# Patient Record
Sex: FEMALE | Race: WHITE | NOT HISPANIC OR LATINO | Employment: OTHER | URBAN - METROPOLITAN AREA
[De-identification: names, ages, dates, MRNs, and addresses within clinical notes are randomized per-mention and may not be internally consistent; named-entity substitution may affect disease eponyms.]

---

## 2023-08-10 LAB
ALANINE AMINOTRANSFERASE (SGPT) (U/L) IN SER/PLAS: 16 U/L (ref 7–45)
ALBUMIN (G/DL) IN SER/PLAS: 4.4 G/DL (ref 3.4–5)
ALKALINE PHOSPHATASE (U/L) IN SER/PLAS: 65 U/L (ref 33–136)
ANION GAP IN SER/PLAS: 11 MMOL/L (ref 10–20)
ASPARTATE AMINOTRANSFERASE (SGOT) (U/L) IN SER/PLAS: 17 U/L (ref 9–39)
BILIRUBIN TOTAL (MG/DL) IN SER/PLAS: 0.4 MG/DL (ref 0–1.2)
CALCIDIOL (25 OH VITAMIN D3) (NG/ML) IN SER/PLAS: 37 NG/ML
CALCIUM (MG/DL) IN SER/PLAS: 9.5 MG/DL (ref 8.6–10.3)
CARBON DIOXIDE, TOTAL (MMOL/L) IN SER/PLAS: 30 MMOL/L (ref 21–32)
CHLORIDE (MMOL/L) IN SER/PLAS: 102 MMOL/L (ref 98–107)
CREATININE (MG/DL) IN SER/PLAS: 0.75 MG/DL (ref 0.5–1.05)
GFR FEMALE: 87 ML/MIN/1.73M2
GLUCOSE (MG/DL) IN SER/PLAS: 92 MG/DL (ref 74–99)
POTASSIUM (MMOL/L) IN SER/PLAS: 4.1 MMOL/L (ref 3.5–5.3)
PROTEIN TOTAL: 7.3 G/DL (ref 6.4–8.2)
SODIUM (MMOL/L) IN SER/PLAS: 139 MMOL/L (ref 136–145)
THYROTROPIN (MIU/L) IN SER/PLAS BY DETECTION LIMIT <= 0.05 MIU/L: 0.94 MIU/L (ref 0.44–3.98)
UREA NITROGEN (MG/DL) IN SER/PLAS: 15 MG/DL (ref 6–23)

## 2023-08-14 LAB
THYROGLOBULIN AB (IU/ML) IN SER/PLAS: <0.9 IU/ML (ref 0–4)
THYROGLOBULIN LC-MS/MS: ABNORMAL NG/ML (ref 1.3–31.8)
THYROGLOBULIN: 0.1 NG/ML (ref 1.3–31.8)

## 2023-08-30 PROBLEM — I10 HYPERTENSION, BENIGN: Status: ACTIVE | Noted: 2023-08-30

## 2023-08-30 PROBLEM — D22.5 ATYPICAL NEVUS OF THORACIC REGION: Status: ACTIVE | Noted: 2023-08-30

## 2023-08-30 PROBLEM — K22.70 BARRETT ESOPHAGUS: Status: ACTIVE | Noted: 2023-08-30

## 2023-08-30 PROBLEM — I25.10 CORONARY ARTERY DISEASE INVOLVING NATIVE CORONARY ARTERY OF NATIVE HEART WITHOUT ANGINA PECTORIS: Status: ACTIVE | Noted: 2023-08-30

## 2023-08-30 RX ORDER — CALCIUM CARBONATE 500(1250)
2 TABLET ORAL DAILY
COMMUNITY

## 2023-08-30 RX ORDER — ATORVASTATIN CALCIUM 80 MG/1
1 TABLET, FILM COATED ORAL DAILY
COMMUNITY
Start: 2018-07-27 | End: 2023-12-20

## 2023-08-30 RX ORDER — ERGOCALCIFEROL 1.25 MG/1
1 CAPSULE ORAL
COMMUNITY
Start: 2016-01-14 | End: 2023-10-23 | Stop reason: ALTCHOICE

## 2023-08-30 RX ORDER — ZINC GLUCONATE 50 MG
1 TABLET ORAL DAILY
COMMUNITY

## 2023-08-30 RX ORDER — AMLODIPINE BESYLATE 5 MG/1
1 TABLET ORAL DAILY
COMMUNITY
Start: 2019-10-15 | End: 2023-12-04

## 2023-08-30 RX ORDER — ASPIRIN 81 MG/1
1 TABLET ORAL DAILY
COMMUNITY

## 2023-08-30 RX ORDER — METOPROLOL TARTRATE 25 MG/1
0.5 TABLET, FILM COATED ORAL DAILY
COMMUNITY
Start: 2016-09-08 | End: 2023-12-04

## 2023-08-30 RX ORDER — OMEPRAZOLE 20 MG/1
1 CAPSULE, DELAYED RELEASE ORAL DAILY
COMMUNITY
Start: 2016-05-31

## 2023-08-30 RX ORDER — ACETAMINOPHEN 160 MG
TABLET,DISINTEGRATING ORAL
COMMUNITY

## 2023-08-30 RX ORDER — LEVOTHYROXINE SODIUM 112 UG/1
112 TABLET ORAL
COMMUNITY
Start: 2021-09-08

## 2023-09-15 LAB
CHOLESTEROL (MG/DL) IN SER/PLAS: 162 MG/DL (ref 0–199)
CHOLESTEROL IN HDL (MG/DL) IN SER/PLAS: 59.6 MG/DL
CHOLESTEROL/HDL RATIO: 2.7
LDL: 70 MG/DL (ref 0–99)
TRIGLYCERIDE (MG/DL) IN SER/PLAS: 164 MG/DL (ref 0–149)
VLDL: 33 MG/DL (ref 0–40)

## 2023-10-06 ENCOUNTER — OFFICE VISIT (OUTPATIENT)
Dept: CARDIOLOGY | Facility: CLINIC | Age: 68
End: 2023-10-06
Payer: MEDICARE

## 2023-10-06 VITALS
HEART RATE: 73 BPM | DIASTOLIC BLOOD PRESSURE: 80 MMHG | BODY MASS INDEX: 28.87 KG/M2 | RESPIRATION RATE: 16 BRPM | SYSTOLIC BLOOD PRESSURE: 136 MMHG | OXYGEN SATURATION: 98 % | WEIGHT: 165.6 LBS

## 2023-10-06 DIAGNOSIS — I10 HYPERTENSION, BENIGN: ICD-10-CM

## 2023-10-06 DIAGNOSIS — I25.10 CORONARY ARTERY DISEASE INVOLVING NATIVE CORONARY ARTERY OF NATIVE HEART WITHOUT ANGINA PECTORIS: Primary | ICD-10-CM

## 2023-10-06 PROCEDURE — 99214 OFFICE O/P EST MOD 30 MIN: CPT | Performed by: INTERNAL MEDICINE

## 2023-10-06 PROCEDURE — 1159F MED LIST DOCD IN RCRD: CPT | Performed by: INTERNAL MEDICINE

## 2023-10-06 PROCEDURE — 3075F SYST BP GE 130 - 139MM HG: CPT | Performed by: INTERNAL MEDICINE

## 2023-10-06 PROCEDURE — 3079F DIAST BP 80-89 MM HG: CPT | Performed by: INTERNAL MEDICINE

## 2023-10-06 NOTE — LETTER
October 6, 2023     Shin Alvarez DO  [address]  [address]  [facility] [address]    Patient: Charo Arnold   YOB: 1955   Date of Visit: 10/6/2023       Dear Dr. Shin Alvarez DO:    Thank you for referring Charo Arnold to me for evaluation. Below are my notes for this consultation.  If you have questions, please do not hesitate to call me. I look forward to following your patient along with you.       Sincerely,     Shin Wagner MD      CC: No Recipients  ______________________________________________________________________________________    Subjective  Patient ID: Charo Arnold is a 68 y.o. female who presents for No chief complaint on file..  The patient is a 68-year-old female former patient of Dr. Ford with a history of coronary artery disease, hyperlipidemia, hypothyroidism, thyroid cancer who presents for evaluation. Her cardiac history begins in 2002 when she presented with a NSTEMI. She underwent cardiac catheterization which demonstrated terminal disease in an obtuse marginal artery that was treated medically. Her main vessels seemed to be angiographically normal. She has done well since that time on medical therapy. She underwent noninvasive stress testing in 2011 which was a normal study. She presents today for follow-up.     She denies any chest pain, shortness of breath, palpitations, lightheadedness, syncope, orthopnea, PND.  She does admits to some dyspnea on exertion which has been slowly progressive over time.     August 2020: Total cholesterol 196, HDL 63, LDL 96, triglycerides 182.   November 2021: Total cholesterol 188, HDL 62, LDL 88, triglycerides 188.   September 2023: Total cholesterol 16 2, LDL 70, HDL 60, triglycerides 164.        Review of Systems   All other systems reviewed and are negative.      Objective  Physical Exam  Vitals and nursing note reviewed.   Constitutional:       Appearance: Normal appearance.   HENT:      Head: Normocephalic and  atraumatic.      Mouth/Throat:      Mouth: Mucous membranes are moist.      Pharynx: Oropharynx is clear.   Cardiovascular:      Rate and Rhythm: Normal rate and regular rhythm.      Pulses: Normal pulses.      Heart sounds: Normal heart sounds.   Pulmonary:      Effort: Pulmonary effort is normal.      Breath sounds: Normal breath sounds.   Abdominal:      General: Bowel sounds are normal.      Palpations: Abdomen is soft.   Musculoskeletal:      Cervical back: Neck supple.   Skin:     General: Skin is warm and dry.   Neurological:      General: No focal deficit present.      Mental Status: She is alert.   Psychiatric:         Mood and Affect: Mood normal.         Behavior: Behavior normal.         Assessment/Plan  Problem List Items Addressed This Visit             ICD-10-CM       Cardiac and Vasculature    Coronary artery disease involving native coronary artery of native heart without angina pectoris - Primary I25.10    Hypertension, benign I10        The patient is doing reasonably well from a cardiac standpoint.  She is having some dyspnea on exertion.  I asked her to increase her activity level to see if this improves.  If she still has this at her next visit or if this gets worse, we will consider additional testing.     Continue amlodipine and metoprolol. She'll otherwise stay on aspirin therapy.     Patient's LDL is at goal of 70.  She will continue atorvastatin.     Patient will follow-up with us in 6 months or sooner if she has more problems.

## 2023-10-06 NOTE — PROGRESS NOTES
Subjective   Patient ID: Charo Arnold is a 68 y.o. female who presents for No chief complaint on file..  The patient is a 68-year-old female former patient of Dr. Ford with a history of coronary artery disease, hyperlipidemia, hypothyroidism, thyroid cancer who presents for evaluation. Her cardiac history begins in 2002 when she presented with a NSTEMI. She underwent cardiac catheterization which demonstrated terminal disease in an obtuse marginal artery that was treated medically. Her main vessels seemed to be angiographically normal. She has done well since that time on medical therapy. She underwent noninvasive stress testing in 2011 which was a normal study. She presents today for follow-up.     She denies any chest pain, shortness of breath, palpitations, lightheadedness, syncope, orthopnea, PND.  She does admits to some dyspnea on exertion which has been slowly progressive over time.     August 2020: Total cholesterol 196, HDL 63, LDL 96, triglycerides 182.   November 2021: Total cholesterol 188, HDL 62, LDL 88, triglycerides 188.   September 2023: Total cholesterol 16 2, LDL 70, HDL 60, triglycerides 164.        Review of Systems   All other systems reviewed and are negative.      Objective   Physical Exam  Vitals and nursing note reviewed.   Constitutional:       Appearance: Normal appearance.   HENT:      Head: Normocephalic and atraumatic.      Mouth/Throat:      Mouth: Mucous membranes are moist.      Pharynx: Oropharynx is clear.   Cardiovascular:      Rate and Rhythm: Normal rate and regular rhythm.      Pulses: Normal pulses.      Heart sounds: Normal heart sounds.   Pulmonary:      Effort: Pulmonary effort is normal.      Breath sounds: Normal breath sounds.   Abdominal:      General: Bowel sounds are normal.      Palpations: Abdomen is soft.   Musculoskeletal:      Cervical back: Neck supple.   Skin:     General: Skin is warm and dry.   Neurological:      General: No focal deficit present.       Mental Status: She is alert.   Psychiatric:         Mood and Affect: Mood normal.         Behavior: Behavior normal.         Assessment/Plan   Problem List Items Addressed This Visit             ICD-10-CM       Cardiac and Vasculature    Coronary artery disease involving native coronary artery of native heart without angina pectoris - Primary I25.10    Hypertension, benign I10        The patient is doing reasonably well from a cardiac standpoint.  She is having some dyspnea on exertion.  I asked her to increase her activity level to see if this improves.  If she still has this at her next visit or if this gets worse, we will consider additional testing.     Continue amlodipine and metoprolol. She'll otherwise stay on aspirin therapy.     Patient's LDL is at goal of 70.  She will continue atorvastatin.     Patient will follow-up with us in 6 months or sooner if she has more problems.

## 2023-10-06 NOTE — LETTER
October 6, 2023       No Recipients    Patient: Charo Arnold   YOB: 1955   Date of Visit: 10/6/2023       Dear Dr. Duenas Recipients:    Thank you for referring Charo Arnold to me for evaluation. Below are my notes for this consultation.  If you have questions, please do not hesitate to call me. I look forward to following your patient along with you.       Sincerely,     Shin Wagner MD      CC:   No Recipients  ______________________________________________________________________________________    Subjective  Patient ID: Charo Arnold is a 68 y.o. female who presents for No chief complaint on file..  The patient is a 68-year-old female former patient of Dr. Ford with a history of coronary artery disease, hyperlipidemia, hypothyroidism, thyroid cancer who presents for evaluation. Her cardiac history begins in 2002 when she presented with a NSTEMI. She underwent cardiac catheterization which demonstrated terminal disease in an obtuse marginal artery that was treated medically. Her main vessels seemed to be angiographically normal. She has done well since that time on medical therapy. She underwent noninvasive stress testing in 2011 which was a normal study. She presents today for follow-up.     She denies any chest pain, shortness of breath, palpitations, lightheadedness, syncope, orthopnea, PND.  She does admits to some dyspnea on exertion which has been slowly progressive over time.     August 2020: Total cholesterol 196, HDL 63, LDL 96, triglycerides 182.   November 2021: Total cholesterol 188, HDL 62, LDL 88, triglycerides 188.   September 2023: Total cholesterol 16 2, LDL 70, HDL 60, triglycerides 164.        Review of Systems   All other systems reviewed and are negative.      Objective  Physical Exam  Vitals and nursing note reviewed.   Constitutional:       Appearance: Normal appearance.   HENT:      Head: Normocephalic and atraumatic.      Mouth/Throat:      Mouth: Mucous membranes  are moist.      Pharynx: Oropharynx is clear.   Cardiovascular:      Rate and Rhythm: Normal rate and regular rhythm.      Pulses: Normal pulses.      Heart sounds: Normal heart sounds.   Pulmonary:      Effort: Pulmonary effort is normal.      Breath sounds: Normal breath sounds.   Abdominal:      General: Bowel sounds are normal.      Palpations: Abdomen is soft.   Musculoskeletal:      Cervical back: Neck supple.   Skin:     General: Skin is warm and dry.   Neurological:      General: No focal deficit present.      Mental Status: She is alert.   Psychiatric:         Mood and Affect: Mood normal.         Behavior: Behavior normal.         Assessment/Plan  Problem List Items Addressed This Visit             ICD-10-CM       Cardiac and Vasculature    Coronary artery disease involving native coronary artery of native heart without angina pectoris - Primary I25.10    Hypertension, benign I10        The patient is doing reasonably well from a cardiac standpoint.  She is having some dyspnea on exertion.  I asked her to increase her activity level to see if this improves.  If she still has this at her next visit or if this gets worse, we will consider additional testing.     Continue amlodipine and metoprolol. She'll otherwise stay on aspirin therapy.     Patient's LDL is at goal of 70.  She will continue atorvastatin.     Patient will follow-up with us in 6 months or sooner if she has more problems.

## 2023-10-23 ENCOUNTER — OFFICE VISIT (OUTPATIENT)
Dept: PRIMARY CARE | Facility: CLINIC | Age: 68
End: 2023-10-23
Payer: MEDICARE

## 2023-10-23 VITALS
OXYGEN SATURATION: 98 % | DIASTOLIC BLOOD PRESSURE: 84 MMHG | SYSTOLIC BLOOD PRESSURE: 150 MMHG | HEART RATE: 80 BPM | BODY MASS INDEX: 28.87 KG/M2 | WEIGHT: 165.6 LBS

## 2023-10-23 DIAGNOSIS — Z12.31 ENCOUNTER FOR SCREENING MAMMOGRAM FOR MALIGNANT NEOPLASM OF BREAST: ICD-10-CM

## 2023-10-23 DIAGNOSIS — Z00.00 WELLNESS EXAMINATION: Primary | ICD-10-CM

## 2023-10-23 DIAGNOSIS — K22.719 BARRETT'S ESOPHAGUS WITH DYSPLASIA: ICD-10-CM

## 2023-10-23 DIAGNOSIS — I10 HYPERTENSION, BENIGN: ICD-10-CM

## 2023-10-23 PROCEDURE — 3079F DIAST BP 80-89 MM HG: CPT | Performed by: STUDENT IN AN ORGANIZED HEALTH CARE EDUCATION/TRAINING PROGRAM

## 2023-10-23 PROCEDURE — 1160F RVW MEDS BY RX/DR IN RCRD: CPT | Performed by: STUDENT IN AN ORGANIZED HEALTH CARE EDUCATION/TRAINING PROGRAM

## 2023-10-23 PROCEDURE — 1159F MED LIST DOCD IN RCRD: CPT | Performed by: STUDENT IN AN ORGANIZED HEALTH CARE EDUCATION/TRAINING PROGRAM

## 2023-10-23 PROCEDURE — 1036F TOBACCO NON-USER: CPT | Performed by: STUDENT IN AN ORGANIZED HEALTH CARE EDUCATION/TRAINING PROGRAM

## 2023-10-23 PROCEDURE — 99214 OFFICE O/P EST MOD 30 MIN: CPT | Performed by: STUDENT IN AN ORGANIZED HEALTH CARE EDUCATION/TRAINING PROGRAM

## 2023-10-23 PROCEDURE — G0439 PPPS, SUBSEQ VISIT: HCPCS | Performed by: STUDENT IN AN ORGANIZED HEALTH CARE EDUCATION/TRAINING PROGRAM

## 2023-10-23 PROCEDURE — 1125F AMNT PAIN NOTED PAIN PRSNT: CPT | Performed by: STUDENT IN AN ORGANIZED HEALTH CARE EDUCATION/TRAINING PROGRAM

## 2023-10-23 PROCEDURE — 1170F FXNL STATUS ASSESSED: CPT | Performed by: STUDENT IN AN ORGANIZED HEALTH CARE EDUCATION/TRAINING PROGRAM

## 2023-10-23 PROCEDURE — 3077F SYST BP >= 140 MM HG: CPT | Performed by: STUDENT IN AN ORGANIZED HEALTH CARE EDUCATION/TRAINING PROGRAM

## 2023-10-23 RX ORDER — FERROUS SULFATE, DRIED 160(50) MG
TABLET, EXTENDED RELEASE ORAL
COMMUNITY
End: 2023-10-23 | Stop reason: ALTCHOICE

## 2023-10-23 RX ORDER — ACETAMINOPHEN 500 MG
2000 TABLET ORAL DAILY
COMMUNITY

## 2023-10-23 RX ORDER — SIMVASTATIN 40 MG/1
40 TABLET, FILM COATED ORAL NIGHTLY
COMMUNITY
End: 2023-10-23 | Stop reason: ALTCHOICE

## 2023-10-23 ASSESSMENT — ACTIVITIES OF DAILY LIVING (ADL)
DRESSING: INDEPENDENT
BATHING: INDEPENDENT
GROCERY_SHOPPING: INDEPENDENT
TAKING_MEDICATION: INDEPENDENT
DOING_HOUSEWORK: INDEPENDENT
MANAGING_FINANCES: INDEPENDENT

## 2023-10-23 ASSESSMENT — PATIENT HEALTH QUESTIONNAIRE - PHQ9
SUM OF ALL RESPONSES TO PHQ9 QUESTIONS 1 AND 2: 0
1. LITTLE INTEREST OR PLEASURE IN DOING THINGS: NOT AT ALL
2. FEELING DOWN, DEPRESSED OR HOPELESS: NOT AT ALL

## 2023-10-23 ASSESSMENT — ENCOUNTER SYMPTOMS
LOSS OF SENSATION IN FEET: 0
DEPRESSION: 0
OCCASIONAL FEELINGS OF UNSTEADINESS: 0

## 2023-10-23 ASSESSMENT — PAIN SCALES - GENERAL: PAINLEVEL: 3

## 2023-10-23 NOTE — PATIENT INSTRUCTIONS
1.  Medicare wellness.  Overall doing well.  Screening labs are up-to-date she sees endocrinology and cardiology.  Screening mammogram order given today.  2 years ago she had both colonoscopy and endoscopy.  She will follow-up with her pharmacy for flu shot in several weeks as today she has a mild cold.  Would also ask her pharmacy about Prevnar 20 is a one-time pneumonia shot after age 65.    2.  Continue routine follow-ups with specialist providers.    3.  Bilateral hip pain trochanteric bursitis.  She is done an excellent job with regular aerobic activity walking and biking.  Discussed possibly adding once or twice a week a flexibility program as well as once or twice a week of strengthening program.    Shin Alvarez DO  for questions and f/u please call office   [place] [phone] [facility] [phone]  any forms needed please fax   [place] [phone] [facility] [phone]  for Physical therapy orders can call [phone]  for Radiology orders can call [phone]  for general referrals can call [id number]  for cardiac testing can call [phone]  for GI testing call [phone]

## 2023-10-23 NOTE — PROGRESS NOTES
Subjective   Patient ID: Charo Arnold is a 68 y.o. female who presents for Medicare Annual Wellness Visit Subsequent (She is not fasting.).    HPI comes in for wellness    Review of Systems  Constitutional: NO F, chills, or sweats  Eyes: no blurred vision or visual disturbance  ENT: no hearing loss, no congestion, no nasal discharge, no hoarseness and no sore throat.   Cardiovascular: no chest pain, no edema, no palps and no syncope.   Respiratory: no cough,no s.o.b. and no wheezing  Gastrointestinal: no abdominal pain, No C/D no N/V, no blood in stools  Genitourinary: no dysuria, no change in urinary frequency, no urinary hesitancy and no feelings of urinary urgency.   Musculoskeletal: no arthralgias,  no back pain and no myalgias.   Integumentary: no new skin lesions and no rashes.   Neurological: no difficulty walking, no headache, no limb weakness, no numbness and no tingling.   Psychiatric: no anxiety, no depression, no anhedonia and no substance use disorders.   Endocrine: no recent weight gain and no recent weight loss.   Hematologic/Lymphatic: no tendency for easy bruising and no swollen glands.  Objective   /84 (BP Location: Left arm, Patient Position: Sitting, BP Cuff Size: Adult)   Pulse 80   Wt 75.1 kg (165 lb 9.6 oz)   SpO2 98%   BMI 28.87 kg/m²     Physical Exam  gen- a & o x 3, nad, pleasant  heent- eomi, perrla, ear canals patent, TM's non-erythematous, no fluid, frontal and maxillary sinus's nontender  neck- supple, nontender, no palpable or enlarged nodes, no thyromegaly  heart- rrr, no murmurs  lungs- cta b/l , no w/r/r  chest- symmetric, nontender  ab- soft, nontender, no palpable organomegaly, postive bowel sounds  ex's- no c/c/e  neuro- CNs 2-12 grossly intact, full sensation and strength in all extremities    Assessment/Plan     1.  Medicare wellness.  Overall doing well.  Screening labs are up-to-date she sees endocrinology and cardiology.  Screening mammogram order given  today.  2 years ago she had both colonoscopy and endoscopy.  She will follow-up with her pharmacy for flu shot in several weeks as today she has a mild cold.  Would also ask her pharmacy about Prevnar 20 is a one-time pneumonia shot after age 65.    2.  Continue routine follow-ups with specialist providers.    3.  Bilateral hip pain trochanteric bursitis.  She is done an excellent job with regular aerobic activity walking and biking.  Discussed possibly adding once or twice a week a flexibility program as well as once or twice a week of strengthening program.

## 2023-10-26 ENCOUNTER — ANCILLARY PROCEDURE (OUTPATIENT)
Dept: RADIOLOGY | Facility: CLINIC | Age: 68
End: 2023-10-26
Payer: MEDICARE

## 2023-10-26 DIAGNOSIS — Z12.31 ENCOUNTER FOR SCREENING MAMMOGRAM FOR MALIGNANT NEOPLASM OF BREAST: ICD-10-CM

## 2023-10-26 DIAGNOSIS — Z00.00 WELLNESS EXAMINATION: ICD-10-CM

## 2023-10-26 PROCEDURE — 77067 SCR MAMMO BI INCL CAD: CPT | Performed by: RADIOLOGY

## 2023-10-26 PROCEDURE — 77063 BREAST TOMOSYNTHESIS BI: CPT | Performed by: RADIOLOGY

## 2023-10-26 PROCEDURE — 77063 BREAST TOMOSYNTHESIS BI: CPT

## 2023-12-03 DIAGNOSIS — I10 HYPERTENSION, BENIGN: ICD-10-CM

## 2023-12-04 RX ORDER — METOPROLOL TARTRATE 25 MG/1
12.5 TABLET, FILM COATED ORAL DAILY
Qty: 45 TABLET | Refills: 3 | Status: SHIPPED | OUTPATIENT
Start: 2023-12-04

## 2023-12-04 RX ORDER — AMLODIPINE BESYLATE 5 MG/1
5 TABLET ORAL DAILY
Qty: 90 TABLET | Refills: 3 | Status: SHIPPED | OUTPATIENT
Start: 2023-12-04

## 2023-12-20 DIAGNOSIS — E78.5 HYPERLIPIDEMIA, UNSPECIFIED HYPERLIPIDEMIA TYPE: ICD-10-CM

## 2023-12-20 RX ORDER — ATORVASTATIN CALCIUM 80 MG/1
80 TABLET, FILM COATED ORAL DAILY
Qty: 90 TABLET | Refills: 3 | Status: SHIPPED | OUTPATIENT
Start: 2023-12-20 | End: 2024-12-19

## 2024-04-19 ENCOUNTER — OFFICE VISIT (OUTPATIENT)
Dept: CARDIOLOGY | Facility: CLINIC | Age: 69
End: 2024-04-19
Payer: MEDICARE

## 2024-04-19 VITALS
WEIGHT: 165.2 LBS | HEIGHT: 64 IN | DIASTOLIC BLOOD PRESSURE: 86 MMHG | OXYGEN SATURATION: 94 % | BODY MASS INDEX: 28.2 KG/M2 | SYSTOLIC BLOOD PRESSURE: 152 MMHG | HEART RATE: 75 BPM

## 2024-04-19 DIAGNOSIS — I10 HYPERTENSION, BENIGN: ICD-10-CM

## 2024-04-19 DIAGNOSIS — I25.10 CORONARY ARTERY DISEASE INVOLVING NATIVE CORONARY ARTERY OF NATIVE HEART WITHOUT ANGINA PECTORIS: ICD-10-CM

## 2024-04-19 PROCEDURE — 3077F SYST BP >= 140 MM HG: CPT | Performed by: INTERNAL MEDICINE

## 2024-04-19 PROCEDURE — 1159F MED LIST DOCD IN RCRD: CPT | Performed by: INTERNAL MEDICINE

## 2024-04-19 PROCEDURE — 3079F DIAST BP 80-89 MM HG: CPT | Performed by: INTERNAL MEDICINE

## 2024-04-19 PROCEDURE — 99214 OFFICE O/P EST MOD 30 MIN: CPT | Performed by: INTERNAL MEDICINE

## 2024-04-19 PROCEDURE — 93000 ELECTROCARDIOGRAM COMPLETE: CPT | Performed by: INTERNAL MEDICINE

## 2024-04-19 NOTE — LETTER
April 19, 2024     Shin Alvarez DO  [address]  [address]  [facility] [address]    Patient: Charo Arnold   YOB: 1955   Date of Visit: 4/19/2024       Dear Dr. Shin Alvarez DO:    Thank you for referring Charo Arnold to me for evaluation. Below are my notes for this consultation.  If you have questions, please do not hesitate to call me. I look forward to following your patient along with you.       Sincerely,     Shin Wagner MD      CC: No Recipients  ______________________________________________________________________________________        [facility] Cardiology Outpatient Follow-up Visit     Reason for Visit: CAD  HPI: Charo Arnold is a 69 y.o.  female who presents today for followup.     The patient is a 69-year-old female former patient of Dr. Ford with a history of coronary artery disease, hyperlipidemia, hypothyroidism, thyroid cancer who presents for evaluation. Her cardiac history begins in 2002 when she presented with a NSTEMI. She underwent cardiac catheterization which demonstrated terminal disease in an obtuse marginal artery that was treated medically. Her main vessels seemed to be angiographically normal. She has done well since that time on medical therapy. She underwent noninvasive stress testing in 2011 which was a normal study. She presents today for follow-up.     She denies any chest pain, shortness of breath, palpitations, lightheadedness, syncope, orthopnea, PND.  Her dyspnea on exertion has resolved after starting an exercise program.  She rides stationary bike for a total of an hour a day.  She also takes a 1 mile walk a day.     August 2020: Total cholesterol 196, HDL 63, LDL 96, triglycerides 182.   November 2021: Total cholesterol 188, HDL 62, LDL 88, triglycerides 188.   September 2023: Total cholesterol 16 2, LDL 70, HDL 60, triglycerides 164.  4/19/2024 ECG: Normal sinus rhythm, otherwise normal    Past Medical History:   She has a past medical  history of Old myocardial infarction, Personal history of malignant neoplasm of thyroid, Personal history of other diseases of the respiratory system (01/10/2019), Personal history of other diseases of the respiratory system (01/24/2018), Personal history of other endocrine, nutritional and metabolic disease (07/27/2018), and Urinary tract infection, site not specified (11/13/2020).    Surgical History:   She has a past surgical history that includes Other surgical history (10/11/2019) and Breast biopsy.    Family History:   Family History   Problem Relation Name Age of Onset   • Hypertension Mother     • Lung cancer Father         Allergies:  Patient has no known allergies.     Social History:   Former smoker    Prior Cardiovascular Testing (Personally Reviewed):     Review of Systems:  Review of Systems   All other systems reviewed and are negative.      Outpatient Medications:    Current Outpatient Medications:   •  amLODIPine (Norvasc) 5 mg tablet, TAKE 1 TABLET BY MOUTH DAILY, Disp: 90 tablet, Rfl: 3  •  aspirin 81 mg EC tablet, Take 1 tablet (81 mg) by mouth once daily., Disp: , Rfl:   •  atorvastatin (Lipitor) 80 mg tablet, Take 1 tablet (80 mg) by mouth once daily., Disp: 90 tablet, Rfl: 3  •  calcium carbonate (Oscal) 500 mg calcium (1,250 mg) tablet, Take 2 tablets (2,500 mg) by mouth once daily., Disp: , Rfl:   •  cholecalciferol (Vitamin D3) 50 mcg (2,000 unit) capsule, Take 1 capsule (50 mcg) by mouth once daily., Disp: , Rfl:   •  levothyroxine (Synthroid) 112 mcg tablet, Take 1 tablet (112 mcg) by mouth., Disp: , Rfl:   •  magnesium oxide 500 mg capsule, Take 1 capsule (500 mg) by mouth once daily., Disp: , Rfl:   •  metoprolol tartrate (Lopressor) 25 mg tablet, TAKE ONE-HALF TABLET BY MOUTH  DAILY, Disp: 45 tablet, Rfl: 3  •  omeprazole (PriLOSEC) 20 mg DR capsule, Take 1 capsule (20 mg) by mouth once daily., Disp: , Rfl:   •  pediatric multivitamin (Gummi Bear Multivitamin) tablet,chewable, Chew.,  "Disp: , Rfl:      Last Recorded Vitals  /86 (BP Location: Right arm, Patient Position: Sitting, BP Cuff Size: Adult)   Pulse 75   Ht 1.626 m (5' 4\")   Wt 74.9 kg (165 lb 3.2 oz)   SpO2 94%   BMI 28.36 kg/m²     Physical Exam:    Physical Exam  Vitals reviewed.   Constitutional:       Appearance: Normal appearance.   HENT:      Head: Normocephalic and atraumatic.      Mouth/Throat:      Mouth: Mucous membranes are moist.      Pharynx: Oropharynx is clear.   Eyes:      Extraocular Movements: Extraocular movements intact.      Conjunctiva/sclera: Conjunctivae normal.   Cardiovascular:      Rate and Rhythm: Normal rate and regular rhythm.      Pulses: Normal pulses.      Heart sounds: Normal heart sounds.   Pulmonary:      Effort: Pulmonary effort is normal.      Breath sounds: Normal breath sounds.   Abdominal:      General: Bowel sounds are normal.      Palpations: Abdomen is soft.   Musculoskeletal:      Cervical back: Neck supple.   Skin:     General: Skin is warm and dry.   Neurological:      General: No focal deficit present.      Mental Status: She is alert.   Psychiatric:         Mood and Affect: Mood normal.         Behavior: Behavior normal.         Lab/Radiology/Diagnostic Review:    Labs    Lab Results   Component Value Date    GLUCOSE 92 08/10/2023    CALCIUM 9.5 08/10/2023     08/10/2023    K 4.1 08/10/2023    CO2 30 08/10/2023     08/10/2023    BUN 15 08/10/2023    CREATININE 0.75 08/10/2023       Lab Results   Component Value Date    WBC 7.9 09/22/2020    HGB 13.5 09/22/2020    HCT 43.2 09/22/2020    MCV 95 09/22/2020     09/22/2020       Lab Results   Component Value Date    CHOL 162 09/15/2023    CHOL 188 11/05/2021    CHOL 196 08/29/2020     Lab Results   Component Value Date    HDL 59.6 09/15/2023    HDL 62.0 11/05/2021    HDL 63.4 08/29/2020     No results found for: \"LDLCALC\"  Lab Results   Component Value Date    TRIG 164 (H) 09/15/2023    TRIG 188 (H) 11/05/2021    " "TRIG 182 (H) 08/29/2020     No components found for: \"CHOLHDL\"    No results found for: \"BNP\"    Lab Results   Component Value Date    TSH 0.94 08/10/2023       Assessment:    The patient is doing reasonably well from a cardiac standpoint.  Her dyspnea on exertion has resolved with exercise.     Continue amlodipine and metoprolol. She'll otherwise stay on aspirin therapy.     Patient's LDL is at goal of 70.  She will continue atorvastatin.     Patient will follow-up with us in 12 months or sooner if she has more problems.     Shin Wagner MD      "

## 2024-04-19 NOTE — PROGRESS NOTES
[facility] Cardiology Outpatient Follow-up Visit     Reason for Visit: CAD  HPI: Charo Arnold is a 69 y.o.  female who presents today for followup.     The patient is a 69-year-old female former patient of Dr. Ford with a history of coronary artery disease, hyperlipidemia, hypothyroidism, thyroid cancer who presents for evaluation. Her cardiac history begins in 2002 when she presented with a NSTEMI. She underwent cardiac catheterization which demonstrated terminal disease in an obtuse marginal artery that was treated medically. Her main vessels seemed to be angiographically normal. She has done well since that time on medical therapy. She underwent noninvasive stress testing in 2011 which was a normal study. She presents today for follow-up.     She denies any chest pain, shortness of breath, palpitations, lightheadedness, syncope, orthopnea, PND.  Her dyspnea on exertion has resolved after starting an exercise program.  She rides stationary bike for a total of an hour a day.  She also takes a 1 mile walk a day.     August 2020: Total cholesterol 196, HDL 63, LDL 96, triglycerides 182.   November 2021: Total cholesterol 188, HDL 62, LDL 88, triglycerides 188.   September 2023: Total cholesterol 16 2, LDL 70, HDL 60, triglycerides 164.  4/19/2024 ECG: Normal sinus rhythm, otherwise normal    Past Medical History:   She has a past medical history of Old myocardial infarction, Personal history of malignant neoplasm of thyroid, Personal history of other diseases of the respiratory system (01/10/2019), Personal history of other diseases of the respiratory system (01/24/2018), Personal history of other endocrine, nutritional and metabolic disease (07/27/2018), and Urinary tract infection, site not specified (11/13/2020).    Surgical History:   She has a past surgical history that includes Other surgical history (10/11/2019) and Breast biopsy.    Family History:   Family History   Problem Relation Name Age of Onset  "   Hypertension Mother      Lung cancer Father         Allergies:  Patient has no known allergies.     Social History:   Former smoker    Prior Cardiovascular Testing (Personally Reviewed):     Review of Systems:  Review of Systems   All other systems reviewed and are negative.      Outpatient Medications:    Current Outpatient Medications:     amLODIPine (Norvasc) 5 mg tablet, TAKE 1 TABLET BY MOUTH DAILY, Disp: 90 tablet, Rfl: 3    aspirin 81 mg EC tablet, Take 1 tablet (81 mg) by mouth once daily., Disp: , Rfl:     atorvastatin (Lipitor) 80 mg tablet, Take 1 tablet (80 mg) by mouth once daily., Disp: 90 tablet, Rfl: 3    calcium carbonate (Oscal) 500 mg calcium (1,250 mg) tablet, Take 2 tablets (2,500 mg) by mouth once daily., Disp: , Rfl:     cholecalciferol (Vitamin D3) 50 mcg (2,000 unit) capsule, Take 1 capsule (50 mcg) by mouth once daily., Disp: , Rfl:     levothyroxine (Synthroid) 112 mcg tablet, Take 1 tablet (112 mcg) by mouth., Disp: , Rfl:     magnesium oxide 500 mg capsule, Take 1 capsule (500 mg) by mouth once daily., Disp: , Rfl:     metoprolol tartrate (Lopressor) 25 mg tablet, TAKE ONE-HALF TABLET BY MOUTH  DAILY, Disp: 45 tablet, Rfl: 3    omeprazole (PriLOSEC) 20 mg DR capsule, Take 1 capsule (20 mg) by mouth once daily., Disp: , Rfl:     pediatric multivitamin (Gummi Bear Multivitamin) tablet,chewable, Chew., Disp: , Rfl:      Last Recorded Vitals  /86 (BP Location: Right arm, Patient Position: Sitting, BP Cuff Size: Adult)   Pulse 75   Ht 1.626 m (5' 4\")   Wt 74.9 kg (165 lb 3.2 oz)   SpO2 94%   BMI 28.36 kg/m²     Physical Exam:    Physical Exam  Vitals reviewed.   Constitutional:       Appearance: Normal appearance.   HENT:      Head: Normocephalic and atraumatic.      Mouth/Throat:      Mouth: Mucous membranes are moist.      Pharynx: Oropharynx is clear.   Eyes:      Extraocular Movements: Extraocular movements intact.      Conjunctiva/sclera: Conjunctivae normal. " "  Cardiovascular:      Rate and Rhythm: Normal rate and regular rhythm.      Pulses: Normal pulses.      Heart sounds: Normal heart sounds.   Pulmonary:      Effort: Pulmonary effort is normal.      Breath sounds: Normal breath sounds.   Abdominal:      General: Bowel sounds are normal.      Palpations: Abdomen is soft.   Musculoskeletal:      Cervical back: Neck supple.   Skin:     General: Skin is warm and dry.   Neurological:      General: No focal deficit present.      Mental Status: She is alert.   Psychiatric:         Mood and Affect: Mood normal.         Behavior: Behavior normal.         Lab/Radiology/Diagnostic Review:    Labs    Lab Results   Component Value Date    GLUCOSE 92 08/10/2023    CALCIUM 9.5 08/10/2023     08/10/2023    K 4.1 08/10/2023    CO2 30 08/10/2023     08/10/2023    BUN 15 08/10/2023    CREATININE 0.75 08/10/2023       Lab Results   Component Value Date    WBC 7.9 09/22/2020    HGB 13.5 09/22/2020    HCT 43.2 09/22/2020    MCV 95 09/22/2020     09/22/2020       Lab Results   Component Value Date    CHOL 162 09/15/2023    CHOL 188 11/05/2021    CHOL 196 08/29/2020     Lab Results   Component Value Date    HDL 59.6 09/15/2023    HDL 62.0 11/05/2021    HDL 63.4 08/29/2020     No results found for: \"LDLCALC\"  Lab Results   Component Value Date    TRIG 164 (H) 09/15/2023    TRIG 188 (H) 11/05/2021    TRIG 182 (H) 08/29/2020     No components found for: \"CHOLHDL\"    No results found for: \"BNP\"    Lab Results   Component Value Date    TSH 0.94 08/10/2023       Assessment:    The patient is doing reasonably well from a cardiac standpoint.  Her dyspnea on exertion has resolved with exercise.     Continue amlodipine and metoprolol. She'll otherwise stay on aspirin therapy.     Patient's LDL is at goal of 70.  She will continue atorvastatin.     Patient will follow-up with us in 12 months or sooner if she has more problems.     Shin Wagner MD      "

## 2024-04-19 NOTE — LETTER
April 19, 2024       No Recipients    Patient: Charo Arnold   YOB: 1955   Date of Visit: 4/19/2024       Dear Dr. Duenas Recipients:    Thank you for referring Charo Arnold to me for evaluation. Below are my notes for this consultation.  If you have questions, please do not hesitate to call me. I look forward to following your patient along with you.       Sincerely,     Shin Wagner MD      CC:   No Recipients  ______________________________________________________________________________________        [facility] Cardiology Outpatient Follow-up Visit     Reason for Visit: CAD  HPI: Charo Arnold is a 69 y.o.  female who presents today for followup.     The patient is a 69-year-old female former patient of Dr. Ford with a history of coronary artery disease, hyperlipidemia, hypothyroidism, thyroid cancer who presents for evaluation. Her cardiac history begins in 2002 when she presented with a NSTEMI. She underwent cardiac catheterization which demonstrated terminal disease in an obtuse marginal artery that was treated medically. Her main vessels seemed to be angiographically normal. She has done well since that time on medical therapy. She underwent noninvasive stress testing in 2011 which was a normal study. She presents today for follow-up.     She denies any chest pain, shortness of breath, palpitations, lightheadedness, syncope, orthopnea, PND.  Her dyspnea on exertion has resolved after starting an exercise program.  She rides stationary bike for a total of an hour a day.  She also takes a 1 mile walk a day.     August 2020: Total cholesterol 196, HDL 63, LDL 96, triglycerides 182.   November 2021: Total cholesterol 188, HDL 62, LDL 88, triglycerides 188.   September 2023: Total cholesterol 16 2, LDL 70, HDL 60, triglycerides 164.  4/19/2024 ECG: Normal sinus rhythm, otherwise normal    Past Medical History:   She has a past medical history of Old myocardial infarction, Personal history of  malignant neoplasm of thyroid, Personal history of other diseases of the respiratory system (01/10/2019), Personal history of other diseases of the respiratory system (01/24/2018), Personal history of other endocrine, nutritional and metabolic disease (07/27/2018), and Urinary tract infection, site not specified (11/13/2020).    Surgical History:   She has a past surgical history that includes Other surgical history (10/11/2019) and Breast biopsy.    Family History:   Family History   Problem Relation Name Age of Onset   • Hypertension Mother     • Lung cancer Father         Allergies:  Patient has no known allergies.     Social History:   Former smoker    Prior Cardiovascular Testing (Personally Reviewed):     Review of Systems:  Review of Systems   All other systems reviewed and are negative.      Outpatient Medications:    Current Outpatient Medications:   •  amLODIPine (Norvasc) 5 mg tablet, TAKE 1 TABLET BY MOUTH DAILY, Disp: 90 tablet, Rfl: 3  •  aspirin 81 mg EC tablet, Take 1 tablet (81 mg) by mouth once daily., Disp: , Rfl:   •  atorvastatin (Lipitor) 80 mg tablet, Take 1 tablet (80 mg) by mouth once daily., Disp: 90 tablet, Rfl: 3  •  calcium carbonate (Oscal) 500 mg calcium (1,250 mg) tablet, Take 2 tablets (2,500 mg) by mouth once daily., Disp: , Rfl:   •  cholecalciferol (Vitamin D3) 50 mcg (2,000 unit) capsule, Take 1 capsule (50 mcg) by mouth once daily., Disp: , Rfl:   •  levothyroxine (Synthroid) 112 mcg tablet, Take 1 tablet (112 mcg) by mouth., Disp: , Rfl:   •  magnesium oxide 500 mg capsule, Take 1 capsule (500 mg) by mouth once daily., Disp: , Rfl:   •  metoprolol tartrate (Lopressor) 25 mg tablet, TAKE ONE-HALF TABLET BY MOUTH  DAILY, Disp: 45 tablet, Rfl: 3  •  omeprazole (PriLOSEC) 20 mg DR capsule, Take 1 capsule (20 mg) by mouth once daily., Disp: , Rfl:   •  pediatric multivitamin (Gummi Bear Multivitamin) tablet,chewable, Chew., Disp: , Rfl:      Last Recorded Vitals  /86 (BP  "Location: Right arm, Patient Position: Sitting, BP Cuff Size: Adult)   Pulse 75   Ht 1.626 m (5' 4\")   Wt 74.9 kg (165 lb 3.2 oz)   SpO2 94%   BMI 28.36 kg/m²     Physical Exam:    Physical Exam  Vitals reviewed.   Constitutional:       Appearance: Normal appearance.   HENT:      Head: Normocephalic and atraumatic.      Mouth/Throat:      Mouth: Mucous membranes are moist.      Pharynx: Oropharynx is clear.   Eyes:      Extraocular Movements: Extraocular movements intact.      Conjunctiva/sclera: Conjunctivae normal.   Cardiovascular:      Rate and Rhythm: Normal rate and regular rhythm.      Pulses: Normal pulses.      Heart sounds: Normal heart sounds.   Pulmonary:      Effort: Pulmonary effort is normal.      Breath sounds: Normal breath sounds.   Abdominal:      General: Bowel sounds are normal.      Palpations: Abdomen is soft.   Musculoskeletal:      Cervical back: Neck supple.   Skin:     General: Skin is warm and dry.   Neurological:      General: No focal deficit present.      Mental Status: She is alert.   Psychiatric:         Mood and Affect: Mood normal.         Behavior: Behavior normal.         Lab/Radiology/Diagnostic Review:    Labs    Lab Results   Component Value Date    GLUCOSE 92 08/10/2023    CALCIUM 9.5 08/10/2023     08/10/2023    K 4.1 08/10/2023    CO2 30 08/10/2023     08/10/2023    BUN 15 08/10/2023    CREATININE 0.75 08/10/2023       Lab Results   Component Value Date    WBC 7.9 09/22/2020    HGB 13.5 09/22/2020    HCT 43.2 09/22/2020    MCV 95 09/22/2020     09/22/2020       Lab Results   Component Value Date    CHOL 162 09/15/2023    CHOL 188 11/05/2021    CHOL 196 08/29/2020     Lab Results   Component Value Date    HDL 59.6 09/15/2023    HDL 62.0 11/05/2021    HDL 63.4 08/29/2020     No results found for: \"LDLCALC\"  Lab Results   Component Value Date    TRIG 164 (H) 09/15/2023    TRIG 188 (H) 11/05/2021    TRIG 182 (H) 08/29/2020     No components found for: " "\"CHOLHDL\"    No results found for: \"BNP\"    Lab Results   Component Value Date    TSH 0.94 08/10/2023       Assessment:    The patient is doing reasonably well from a cardiac standpoint.  Her dyspnea on exertion has resolved with exercise.     Continue amlodipine and metoprolol. She'll otherwise stay on aspirin therapy.     Patient's LDL is at goal of 70.  She will continue atorvastatin.     Patient will follow-up with us in 12 months or sooner if she has more problems.     Shin Wagner MD    "

## 2024-05-15 ENCOUNTER — OFFICE VISIT (OUTPATIENT)
Dept: PRIMARY CARE | Facility: CLINIC | Age: 69
End: 2024-05-15
Payer: MEDICARE

## 2024-05-15 VITALS
WEIGHT: 162 LBS | BODY MASS INDEX: 27.81 KG/M2 | HEART RATE: 80 BPM | OXYGEN SATURATION: 98 % | SYSTOLIC BLOOD PRESSURE: 136 MMHG | DIASTOLIC BLOOD PRESSURE: 74 MMHG

## 2024-05-15 DIAGNOSIS — S03.00XS DISLOCATION OF TEMPOROMANDIBULAR JOINT, SEQUELA: Primary | ICD-10-CM

## 2024-05-15 DIAGNOSIS — M72.2 PLANTAR FASCIA SYNDROME: ICD-10-CM

## 2024-05-15 PROBLEM — I25.2 HISTORY OF MYOCARDIAL INFARCTION: Status: ACTIVE | Noted: 2024-05-15

## 2024-05-15 PROBLEM — Z85.850 HISTORY OF MALIGNANT NEOPLASM OF THYROID: Status: ACTIVE | Noted: 2024-05-15

## 2024-05-15 PROBLEM — D23.5 DYSPLASTIC NEVUS OF TRUNK: Status: ACTIVE | Noted: 2023-08-30

## 2024-05-15 PROBLEM — Z86.39 HISTORY OF ELEVATED LIPIDS: Status: ACTIVE | Noted: 2024-05-15

## 2024-05-15 PROBLEM — J01.90 ACUTE SINUSITIS: Status: ACTIVE | Noted: 2024-05-15

## 2024-05-15 PROBLEM — M25.559 ARTHRALGIA OF HIP: Status: ACTIVE | Noted: 2024-05-15

## 2024-05-15 PROCEDURE — 1125F AMNT PAIN NOTED PAIN PRSNT: CPT | Performed by: STUDENT IN AN ORGANIZED HEALTH CARE EDUCATION/TRAINING PROGRAM

## 2024-05-15 PROCEDURE — 1159F MED LIST DOCD IN RCRD: CPT | Performed by: STUDENT IN AN ORGANIZED HEALTH CARE EDUCATION/TRAINING PROGRAM

## 2024-05-15 PROCEDURE — 1160F RVW MEDS BY RX/DR IN RCRD: CPT | Performed by: STUDENT IN AN ORGANIZED HEALTH CARE EDUCATION/TRAINING PROGRAM

## 2024-05-15 PROCEDURE — 1036F TOBACCO NON-USER: CPT | Performed by: STUDENT IN AN ORGANIZED HEALTH CARE EDUCATION/TRAINING PROGRAM

## 2024-05-15 PROCEDURE — 3078F DIAST BP <80 MM HG: CPT | Performed by: STUDENT IN AN ORGANIZED HEALTH CARE EDUCATION/TRAINING PROGRAM

## 2024-05-15 PROCEDURE — 99213 OFFICE O/P EST LOW 20 MIN: CPT | Performed by: STUDENT IN AN ORGANIZED HEALTH CARE EDUCATION/TRAINING PROGRAM

## 2024-05-15 PROCEDURE — 3075F SYST BP GE 130 - 139MM HG: CPT | Performed by: STUDENT IN AN ORGANIZED HEALTH CARE EDUCATION/TRAINING PROGRAM

## 2024-05-15 RX ORDER — PREDNISONE 10 MG/1
TABLET ORAL
Qty: 18 TABLET | Refills: 0 | Status: SHIPPED | OUTPATIENT
Start: 2024-05-15

## 2024-05-15 ASSESSMENT — PAIN SCALES - GENERAL: PAINLEVEL: 6

## 2024-05-15 ASSESSMENT — ENCOUNTER SYMPTOMS: DEPRESSION: 0

## 2024-05-15 NOTE — PATIENT INSTRUCTIONS
Shin Alvarez DO  for questions and f/u please call office   [place] [phone] [facility] [phone]  any forms needed please fax   [place] [phone] [facility] [phone]  for Physical therapy orders can call [phone]  for Radiology orders can call [phone]  for general referrals can call [phone]  for cardiac testing can call [phone]  for GI testing call [phone]

## 2024-05-15 NOTE — PROGRESS NOTES
Subjective   Patient ID: Charo Arnold is a 69 y.o. female who presents for Temporomandibular Joint Pain (Flare up.) and Foot Pain (Right foot pain.).    HPI comes in with flareup of right-sided TMJ.  Left-sided plantar fascia symptoms    Review of Systems  Constitutional: NO F, chills, or sweats  Eyes: no blurred vision or visual disturbance  ENT: no hearing loss, no congestion, no nasal discharge, no hoarseness and no sore throat.   Cardiovascular: no chest pain, no edema, no palps and no syncope.   Respiratory: no cough,no s.o.b. and no wheezing  Gastrointestinal: no abdominal pain, No C/D no N/V, no blood in stools  Genitourinary: no dysuria, no change in urinary frequency, no urinary hesitancy and no feelings of urinary urgency.   Musculoskeletal: TMJ flareup left plantar fascia flareup  Objective   /74 (BP Location: Right arm, Patient Position: Sitting, BP Cuff Size: Adult)   Pulse 80   Wt 73.5 kg (162 lb)   SpO2 98%   BMI 27.81 kg/m²     Physical Exam  gen- a & o x 3, nad, pleasant    Assessment/Plan     #1.  Right-sided TMJ.  Steroid taper as directed.  Physical therapy ordered    #2.  Left-sided plantar fascia flareup.  Steroid taper.  She will follow-up with podiatry if no improvement

## 2024-09-03 ENCOUNTER — LAB (OUTPATIENT)
Dept: LAB | Facility: LAB | Age: 69
End: 2024-09-03
Payer: MEDICARE

## 2024-09-03 DIAGNOSIS — E55.9 VITAMIN D DEFICIENCY, UNSPECIFIED: Primary | ICD-10-CM

## 2024-09-03 DIAGNOSIS — Z85.850 PERSONAL HISTORY OF MALIGNANT NEOPLASM OF THYROID: ICD-10-CM

## 2024-09-03 LAB
25(OH)D3 SERPL-MCNC: 40 NG/ML (ref 30–100)
ALBUMIN SERPL BCP-MCNC: 4.2 G/DL (ref 3.4–5)
ALP SERPL-CCNC: 97 U/L (ref 33–136)
ALT SERPL W P-5'-P-CCNC: 47 U/L (ref 7–45)
ANION GAP SERPL CALC-SCNC: 13 MMOL/L (ref 10–20)
AST SERPL W P-5'-P-CCNC: 35 U/L (ref 9–39)
BILIRUB SERPL-MCNC: 0.5 MG/DL (ref 0–1.2)
BUN SERPL-MCNC: 14 MG/DL (ref 6–23)
CALCIUM SERPL-MCNC: 9.3 MG/DL (ref 8.6–10.3)
CHLORIDE SERPL-SCNC: 101 MMOL/L (ref 98–107)
CO2 SERPL-SCNC: 30 MMOL/L (ref 21–32)
CREAT SERPL-MCNC: 0.76 MG/DL (ref 0.5–1.05)
EGFRCR SERPLBLD CKD-EPI 2021: 85 ML/MIN/1.73M*2
GLUCOSE SERPL-MCNC: 106 MG/DL (ref 74–99)
POTASSIUM SERPL-SCNC: 4.7 MMOL/L (ref 3.5–5.3)
PROT SERPL-MCNC: 6.7 G/DL (ref 6.4–8.2)
SODIUM SERPL-SCNC: 139 MMOL/L (ref 136–145)
TSH SERPL-ACNC: 1.64 MIU/L (ref 0.44–3.98)

## 2024-09-03 PROCEDURE — 82306 VITAMIN D 25 HYDROXY: CPT

## 2024-09-03 PROCEDURE — 86800 THYROGLOBULIN ANTIBODY: CPT

## 2024-09-03 PROCEDURE — 84432 ASSAY OF THYROGLOBULIN: CPT

## 2024-09-03 PROCEDURE — 84443 ASSAY THYROID STIM HORMONE: CPT

## 2024-09-03 PROCEDURE — 80053 COMPREHEN METABOLIC PANEL: CPT

## 2024-09-03 PROCEDURE — 36415 COLL VENOUS BLD VENIPUNCTURE: CPT

## 2024-09-05 LAB
BILL ONLY-THYROGLOBULIN: NORMAL
THYROGLOB AB SERPL-ACNC: <0.9 IU/ML (ref 0–4)
THYROGLOB AB SERPL-ACNC: <0.9 IU/ML (ref 0–4)
THYROGLOB SERPL-MCNC: 0.1 NG/ML (ref 1.3–31.8)
THYROGLOB SERPL-MCNC: ABNORMAL NG/ML (ref 1.3–31.8)

## 2024-09-21 DIAGNOSIS — I10 HYPERTENSION, BENIGN: ICD-10-CM

## 2024-09-23 RX ORDER — AMLODIPINE BESYLATE 5 MG/1
5 TABLET ORAL DAILY
Qty: 100 TABLET | Refills: 2 | Status: SHIPPED | OUTPATIENT
Start: 2024-09-23

## 2024-10-05 DIAGNOSIS — I10 HYPERTENSION, BENIGN: ICD-10-CM

## 2024-10-07 RX ORDER — METOPROLOL TARTRATE 25 MG/1
12.5 TABLET, FILM COATED ORAL DAILY
Qty: 50 TABLET | Refills: 2 | Status: SHIPPED | OUTPATIENT
Start: 2024-10-07

## 2025-01-07 DIAGNOSIS — E78.5 HYPERLIPIDEMIA, UNSPECIFIED HYPERLIPIDEMIA TYPE: ICD-10-CM

## 2025-01-07 RX ORDER — ATORVASTATIN CALCIUM 80 MG/1
80 TABLET, FILM COATED ORAL DAILY
Qty: 90 TABLET | Refills: 3 | Status: SHIPPED | OUTPATIENT
Start: 2025-01-07 | End: 2026-01-07

## 2025-02-05 DIAGNOSIS — E78.5 HYPERLIPIDEMIA, UNSPECIFIED HYPERLIPIDEMIA TYPE: Primary | ICD-10-CM

## 2025-02-06 RX ORDER — ATORVASTATIN CALCIUM 80 MG/1
80 TABLET, FILM COATED ORAL DAILY
Qty: 90 TABLET | Refills: 3 | Status: SHIPPED | OUTPATIENT
Start: 2025-02-06 | End: 2026-02-06

## 2025-04-18 ENCOUNTER — APPOINTMENT (OUTPATIENT)
Dept: CARDIOLOGY | Facility: CLINIC | Age: 70
End: 2025-04-18
Payer: MEDICARE

## 2025-04-21 ENCOUNTER — APPOINTMENT (OUTPATIENT)
Dept: CARDIOLOGY | Facility: CLINIC | Age: 70
End: 2025-04-21
Payer: MEDICARE

## 2025-04-21 VITALS
HEIGHT: 64 IN | WEIGHT: 165 LBS | OXYGEN SATURATION: 98 % | BODY MASS INDEX: 28.17 KG/M2 | SYSTOLIC BLOOD PRESSURE: 158 MMHG | HEART RATE: 73 BPM | DIASTOLIC BLOOD PRESSURE: 80 MMHG

## 2025-04-21 DIAGNOSIS — I25.10 ATHEROSCLEROSIS OF CORONARY ARTERY OF NATIVE HEART WITHOUT ANGINA PECTORIS, UNSPECIFIED VESSEL OR LESION TYPE: Primary | ICD-10-CM

## 2025-04-21 DIAGNOSIS — I10 BENIGN HYPERTENSION: ICD-10-CM

## 2025-04-21 DIAGNOSIS — I25.2 HISTORY OF MYOCARDIAL INFARCTION: ICD-10-CM

## 2025-04-21 DIAGNOSIS — E78.5 DYSLIPIDEMIA, GOAL LDL BELOW 100: ICD-10-CM

## 2025-04-21 PROBLEM — J01.90 ACUTE SINUSITIS: Status: RESOLVED | Noted: 2024-05-15 | Resolved: 2025-04-21

## 2025-04-21 LAB
ATRIAL RATE: 73 BPM
P AXIS: 68 DEGREES
P OFFSET: 188 MS
P ONSET: 140 MS
PR INTERVAL: 164 MS
Q ONSET: 222 MS
QRS COUNT: 12 BEATS
QRS DURATION: 78 MS
QT INTERVAL: 420 MS
QTC CALCULATION(BAZETT): 462 MS
QTC FREDERICIA: 448 MS
R AXIS: 60 DEGREES
T AXIS: 85 DEGREES
T OFFSET: 432 MS
VENTRICULAR RATE: 73 BPM

## 2025-04-21 PROCEDURE — 99214 OFFICE O/P EST MOD 30 MIN: CPT | Mod: 25 | Performed by: STUDENT IN AN ORGANIZED HEALTH CARE EDUCATION/TRAINING PROGRAM

## 2025-04-21 PROCEDURE — 3079F DIAST BP 80-89 MM HG: CPT | Performed by: STUDENT IN AN ORGANIZED HEALTH CARE EDUCATION/TRAINING PROGRAM

## 2025-04-21 PROCEDURE — 3008F BODY MASS INDEX DOCD: CPT | Performed by: STUDENT IN AN ORGANIZED HEALTH CARE EDUCATION/TRAINING PROGRAM

## 2025-04-21 PROCEDURE — 1159F MED LIST DOCD IN RCRD: CPT | Performed by: STUDENT IN AN ORGANIZED HEALTH CARE EDUCATION/TRAINING PROGRAM

## 2025-04-21 PROCEDURE — 99214 OFFICE O/P EST MOD 30 MIN: CPT | Performed by: STUDENT IN AN ORGANIZED HEALTH CARE EDUCATION/TRAINING PROGRAM

## 2025-04-21 PROCEDURE — 1160F RVW MEDS BY RX/DR IN RCRD: CPT | Performed by: STUDENT IN AN ORGANIZED HEALTH CARE EDUCATION/TRAINING PROGRAM

## 2025-04-21 PROCEDURE — 3077F SYST BP >= 140 MM HG: CPT | Performed by: STUDENT IN AN ORGANIZED HEALTH CARE EDUCATION/TRAINING PROGRAM

## 2025-04-21 PROCEDURE — 93010 ELECTROCARDIOGRAM REPORT: CPT | Performed by: STUDENT IN AN ORGANIZED HEALTH CARE EDUCATION/TRAINING PROGRAM

## 2025-04-21 PROCEDURE — 1036F TOBACCO NON-USER: CPT | Performed by: STUDENT IN AN ORGANIZED HEALTH CARE EDUCATION/TRAINING PROGRAM

## 2025-04-21 PROCEDURE — 93005 ELECTROCARDIOGRAM TRACING: CPT | Performed by: STUDENT IN AN ORGANIZED HEALTH CARE EDUCATION/TRAINING PROGRAM

## 2025-04-21 RX ORDER — CALCIUM CARB/VITAMIN D3/VIT K1 500-500-40
TABLET,CHEWABLE ORAL
COMMUNITY
Start: 2024-11-26

## 2025-04-21 ASSESSMENT — ENCOUNTER SYMPTOMS
CONSTITUTIONAL NEGATIVE: 1
ALLERGIC/IMMUNOLOGIC NEGATIVE: 1
DYSPNEA ON EXERTION: 0
ORTHOPNEA: 0
NEUROLOGICAL NEGATIVE: 1
ENDOCRINE NEGATIVE: 1
PSYCHIATRIC NEGATIVE: 1
PND: 0
GASTROINTESTINAL NEGATIVE: 1
SYNCOPE: 0
NEAR-SYNCOPE: 0
EYES NEGATIVE: 1
RESPIRATORY NEGATIVE: 1
PALPITATIONS: 0
HEMATOLOGIC/LYMPHATIC NEGATIVE: 1
MUSCULOSKELETAL NEGATIVE: 1

## 2025-04-21 NOTE — PROGRESS NOTES
Cardiology New Patient History and Physical    Reason for referral: [facility]    HPI: Charo Arnold is a 70 y.o.  female who presents today for atherosclerotic heart disease ([facility]). Past medical history of CAD s/p remote coronary stenting (left circumflex- 2002), hypertension, dyslipidemia, former smoker.    Per patient remote history of coronary artery stenting in 2022 in the setting of NSTEMI.  Patient also had terminal disease in the obtuse marginal artery that was medically managed.  Subsequently underwent noninvasive stress test in 2011 which was low risk for ischemia.  Patient previously followed with interventional cardiology and was referred to establish care with general cardiology.    Charo presented to cardiology clinic on 4/21/2025.  Patient is asymptomatic from cardiovascular standpoint.  No chest pain, dyspnea, orthopnea, PND, syncope, presyncope, palpitations, or pedal edema.  Patient tolerating regular physical activity without active cardiovascular limitations.  Patient is a former smoker; quit in remote past.  Although blood pressure is elevated in office today, per chart review blood pressure has been well-controlled.  We will monitor clinically and if hypertension suboptimally controlled would then increase amlodipine to 10 mg daily.  We will continue cardiac management as below and follow-up in 1 year or earlier if needed.    Past Medical History:   - as above    Surgical History:   She has a past surgical history that includes Other surgical history (10/11/2019) and Breast biopsy.    Family History:   Family History   Problem Relation Name Age of Onset    Hypertension Mother      Lung cancer Father         Allegies:  Patient has no known allergies.     Social History:   - Former smoker    Prior Cardiovascular Testing (personally reviewed):     ECG (4/21/2025)- normal sinus rhythm    August 2020: Total cholesterol 196, HDL 63, LDL 96, triglycerides 182.   November 2021: Total cholesterol  "188, HDL 62, LDL 88, triglycerides 188.   September 2023: Total cholesterol 16 2, LDL 70, HDL 60, triglycerides 164.  4/19/2024 ECG: Normal sinus rhythm, otherwise normal    Coronary angiograph (2002- NSTEMI)- s/p stenting of left circumflex    Review of Systems:  Review of Systems   Constitutional: Negative.   HENT: Negative.     Eyes: Negative.    Cardiovascular:  Negative for chest pain, dyspnea on exertion, near-syncope, orthopnea, palpitations, paroxysmal nocturnal dyspnea and syncope.   Respiratory: Negative.     Endocrine: Negative.    Hematologic/Lymphatic: Negative.    Skin: Negative.    Musculoskeletal: Negative.    Gastrointestinal: Negative.    Genitourinary: Negative.    Neurological: Negative.    Psychiatric/Behavioral: Negative.     Allergic/Immunologic: Negative.        Objective     Outpatient Medications:  Current Medications[1]     Last Recorded Vitals  /80 (BP Location: Left arm, Patient Position: Sitting, BP Cuff Size: Adult)   Pulse 73   Ht 1.626 m (5' 4\")   Wt 74.8 kg (165 lb)   SpO2 98%   BMI 28.32 kg/m²     Physical Exam:  Physical Exam  Constitutional:       General: She is not in acute distress.  HENT:      Head: Normocephalic.      Mouth/Throat:      Mouth: Mucous membranes are moist.   Eyes:      Extraocular Movements: Extraocular movements intact.      Conjunctiva/sclera: Conjunctivae normal.   Neck:      Vascular: No carotid bruit or JVD.   Cardiovascular:      Rate and Rhythm: Normal rate and regular rhythm.      Pulses: Normal pulses.      Heart sounds: No murmur heard.  Pulmonary:      Effort: Pulmonary effort is normal. No respiratory distress.      Breath sounds: Normal breath sounds.   Abdominal:      General: Bowel sounds are normal. There is no distension.      Palpations: Abdomen is soft.   Musculoskeletal:         General: No swelling.   Skin:     General: Skin is warm and dry.   Neurological:      General: No focal deficit present.      Mental Status: She is " "alert.      Cranial Nerves: No cranial nerve deficit.      Motor: No weakness.   Psychiatric:         Mood and Affect: Mood normal.         Behavior: Behavior normal.         Lab Review:    Lab Results   Component Value Date    GLUCOSE 106 (H) 09/03/2024    CALCIUM 9.3 09/03/2024     09/03/2024    K 4.7 09/03/2024    CO2 30 09/03/2024     09/03/2024    BUN 14 09/03/2024    CREATININE 0.76 09/03/2024       Lab Results   Component Value Date    WBC 7.9 09/22/2020    HGB 13.5 09/22/2020    HCT 43.2 09/22/2020    MCV 95 09/22/2020     09/22/2020       Lab Results   Component Value Date    CHOL 162 09/15/2023    CHOL 188 11/05/2021    CHOL 196 08/29/2020     Lab Results   Component Value Date    HDL 59.6 09/15/2023    HDL 62.0 11/05/2021    HDL 63.4 08/29/2020     No results found for: \"LDLCALC\"  Lab Results   Component Value Date    TRIG 164 (H) 09/15/2023    TRIG 188 (H) 11/05/2021    TRIG 182 (H) 08/29/2020       Lab Results   Component Value Date    TSH 1.64 09/03/2024       Assessment:   70 y.o.  female who presents today for atherosclerotic heart disease (ASHD). Past medical history of CAD s/p remote coronary stenting (left circumflex- 2002), hypertension, dyslipidemia, former smoker.    Charo presented to cardiology clinic on 4/21/2025.  Patient is asymptomatic from cardiovascular standpoint.  No chest pain, dyspnea, orthopnea, PND, syncope, presyncope, palpitations, or pedal edema.  Patient tolerating regular physical activity without active cardiovascular limitations.  Patient is a former smoker; quit in remote past.  Although blood pressure is elevated in office today, per chart review blood pressure has been well-controlled.  We will monitor clinically and if hypertension suboptimally controlled would then increase amlodipine to 10 mg daily.  We will continue cardiac management as below and follow-up in 1 year or earlier if needed.    Overall Plan:  1.  Atherosclerotic heart disease " "complicated by remote NSTEMI  - Currently asymptomatic  - Continue aspirin 81 mg daily, statin therapy, beta-blockade  - Encouraged heart healthy diet  - Encouraged regular physical activity  - Dyslipidemia management as below    2.  Dyslipidemia  - Goal LDL less than 100 mg/dL  - Continue atorvastatin 80 mg daily  - Repeat fasting lipid panel prior to follow-up visit  - Encouraged heart healthy diet    3.  Primary hypertension  - Continue amlodipine 5 mg daily; if hypertension suboptimally controlled would then increase amlodipine to 10 mg daily  - Continue metoprolol  - If additional antihypertensive therapy needed would then consider losartan  - Goal blood pressure less than 130/90 mmHg    4. Discussed \"red flag\" symptoms that should prompt immediate medical attention; patient verbalized understanding    Disposition: Return to cardiology clinic in 12 months    Joe Tate MD             [1]   Current Outpatient Medications:     amLODIPine (Norvasc) 5 mg tablet, TAKE 1 TABLET BY MOUTH DAILY, Disp: 100 tablet, Rfl: 2    aspirin 81 mg EC tablet, Take 1 tablet (81 mg) by mouth once daily., Disp: , Rfl:     atorvastatin (Lipitor) 80 mg tablet, Take 1 tablet (80 mg) by mouth once daily., Disp: 90 tablet, Rfl: 3    calcium carbonate (Oscal) 500 mg calcium (1,250 mg) tablet, Take 2 tablets (2,500 mg) by mouth once daily., Disp: , Rfl:     cholecalciferol (Vitamin D3) 50 mcg (2,000 unit) capsule, Take 1 capsule (2,000 Units) by mouth once daily., Disp: , Rfl:     levothyroxine (Synthroid) 112 mcg tablet, Take 1 tablet (112 mcg) by mouth., Disp: , Rfl:     magnesium oxide 500 mg capsule, Take 1 capsule (500 mg) by mouth once daily., Disp: , Rfl:     metoprolol tartrate (Lopressor) 25 mg tablet, TAKE ONE-HALF TABLET BY MOUTH  DAILY, Disp: 50 tablet, Rfl: 2    multivit-min-ferrous fumarate (Multi Vitamin) 9 mg iron/15 mL liquid, Refill(s) 0, Date: 11/26/24 10:27:00 AM EST, Disp: , Rfl:     omeprazole (PriLOSEC) 20 mg " DR capsule, Take 1 capsule (20 mg) by mouth once daily., Disp: , Rfl:     predniSONE (Deltasone) 10 mg tablet, Take 3 tablets po per day for 3 days, then 2 tablets po per day for 3 days, then 1 tablet po per day for 3 days then stop, Disp: 18 tablet, Rfl: 0

## 2025-04-21 NOTE — PATIENT INSTRUCTIONS
We will re-check a cholesterol lab and continue your current medications.     Your ECG looked normal today.     We will see you back in heart clinic in 1 year or earlier if needed.     Thank you for your visit today. Please contact our office (via [facility] or phone) with any additional questions.     [facility] [place]    Vanessa, RN/Clinic Nurse for:    Dr. Bebeto Wagner    [address], [address]  [place] [address]    Phone: [phone] Press Option 5 then Option 3 to speak with the Clinic Nurse (Vanessa)    _____    To Reach:    Billing Questions -    [phone]  Scheduling / Rescheduling -  Option 1  Refills / Medication Requests -  Option 3  General Office / [place] -  Option 4  Results -     Option 6  Medical Records -    Option 7  Repeat Options -    Option 9

## 2025-05-09 LAB
CHOLEST SERPL-MCNC: 174 MG/DL
CHOLEST/HDLC SERPL: 2.6 (CALC)
HDLC SERPL-MCNC: 68 MG/DL
LDLC SERPL CALC-MCNC: 79 MG/DL (CALC)
NONHDLC SERPL-MCNC: 106 MG/DL (CALC)
TRIGL SERPL-MCNC: 172 MG/DL

## 2025-07-16 ENCOUNTER — TELEPHONE (OUTPATIENT)
Dept: PRIMARY CARE | Facility: CLINIC | Age: 70
End: 2025-07-16
Payer: MEDICARE

## 2025-07-16 NOTE — TELEPHONE ENCOUNTER
Pt was a dr. Alvarez pt -not a steffen pt & would like for her & her    To come to you.  Please advise  Ty

## 2025-07-22 DIAGNOSIS — I10 HYPERTENSION, BENIGN: ICD-10-CM

## 2025-07-22 RX ORDER — AMLODIPINE BESYLATE 5 MG/1
5 TABLET ORAL DAILY
Qty: 100 TABLET | Refills: 2 | Status: SHIPPED | OUTPATIENT
Start: 2025-07-22

## 2025-07-22 RX ORDER — METOPROLOL TARTRATE 25 MG/1
12.5 TABLET, FILM COATED ORAL DAILY
Qty: 50 TABLET | Refills: 2 | Status: SHIPPED | OUTPATIENT
Start: 2025-07-22

## 2025-10-28 ENCOUNTER — APPOINTMENT (OUTPATIENT)
Dept: PRIMARY CARE | Facility: CLINIC | Age: 70
End: 2025-10-28
Payer: MEDICARE